# Patient Record
Sex: MALE | NOT HISPANIC OR LATINO | URBAN - METROPOLITAN AREA
[De-identification: names, ages, dates, MRNs, and addresses within clinical notes are randomized per-mention and may not be internally consistent; named-entity substitution may affect disease eponyms.]

---

## 2020-07-25 ENCOUNTER — TELEPHONE ENCOUNTER (OUTPATIENT)
Dept: URBAN - METROPOLITAN AREA CLINIC 13 | Facility: CLINIC | Age: 75
End: 2020-07-25

## 2020-07-25 RX ORDER — POLYETHYLENE GLYCOL 3350, SODIUM CHLORIDE, SODIUM BICARBONATE AND POTASSIUM CHLORIDE WITH LEMON FLAVOR 420; 11.2; 5.72; 1.48 G/4L; G/4L; G/4L; G/4L
TAKE 1/2 GALLON AT 5:00 PM DAY BEFORE PROCEDURE, TAKE SECOND 1/2 OF GALLON 6 HRS PRIOR TO PROCEDURE POWDER, FOR SOLUTION ORAL
Qty: 1 | Refills: 0 | OUTPATIENT
Start: 2019-11-13 | End: 2019-12-06

## 2020-07-25 RX ORDER — GEMFIBROZIL 600 MG/1
TABLET, FILM COATED ORAL
Refills: 0 | OUTPATIENT
Start: 2007-03-07 | End: 2019-10-15

## 2020-07-25 RX ORDER — ASPIRIN 81 MG/1
CHEW AND SWALLOW 1 TABLET DAILY TABLET, CHEWABLE ORAL
Refills: 0 | OUTPATIENT
Start: 2019-10-15 | End: 2019-12-06

## 2020-07-26 ENCOUNTER — TELEPHONE ENCOUNTER (OUTPATIENT)
Dept: URBAN - METROPOLITAN AREA CLINIC 13 | Facility: CLINIC | Age: 75
End: 2020-07-26

## 2020-07-26 RX ORDER — CEPHALEXIN 500 MG/1
CAPSULE ORAL
Qty: 40 | Refills: 0 | Status: ACTIVE | COMMUNITY
Start: 2019-05-22

## 2020-07-26 RX ORDER — AMOXICILLIN 500 MG/1
CAPSULE ORAL
Qty: 4 | Refills: 0 | Status: ACTIVE | COMMUNITY
Start: 2018-10-19

## 2020-07-26 RX ORDER — FENOFIBRATE 145 MG/1
TAKE 1 TABLET DAILY TABLET, FILM COATED ORAL
Refills: 0 | Status: ACTIVE | COMMUNITY
Start: 2019-10-15

## 2020-07-26 RX ORDER — LEVOTHYROXINE SODIUM 112 UG/1
TAKE 1 TABLET DAILY TABLET ORAL
Refills: 0 | Status: ACTIVE | COMMUNITY
Start: 2019-10-15

## 2020-07-26 RX ORDER — ASCORBIC ACID 1000 MG
TAKE 1 CAPSULE DAILY TABLET ORAL
Refills: 0 | Status: ACTIVE | COMMUNITY
Start: 2019-10-15

## 2020-07-26 RX ORDER — METHYLPREDNISOLONE 4 MG/1
TABLET ORAL
Qty: 21 | Refills: 0 | Status: ACTIVE | COMMUNITY
Start: 2019-11-25

## 2020-07-26 RX ORDER — SITAGLIPTIN PHOSPHATE 100 MG
TAKE 1 TABLET DAILY TABLET ORAL
Refills: 0 | Status: ACTIVE | COMMUNITY
Start: 2019-10-15

## 2020-07-26 RX ORDER — LISINOPRIL 5 MG/1
TAKE 1 TABLET DAILY TABLET ORAL
Refills: 0 | Status: ACTIVE | COMMUNITY
Start: 2019-10-15

## 2020-07-26 RX ORDER — DICLOFENAC SODIUM 10 MG/G
GEL TOPICAL
Qty: 100 | Refills: 0 | Status: ACTIVE | COMMUNITY
Start: 2019-10-22

## 2020-07-26 RX ORDER — MELOXICAM 15 MG/1
TABLET ORAL
Qty: 30 | Refills: 0 | Status: ACTIVE | COMMUNITY
Start: 2019-10-02

## 2020-07-26 RX ORDER — HYDROCODONE BITARTRATE AND ACETAMINOPHEN 7.5; 325 MG/1; MG/1
TABLET ORAL
Qty: 28 | Refills: 0 | Status: ACTIVE | COMMUNITY
Start: 2019-05-22

## 2020-07-26 RX ORDER — DICLOFENAC SODIUM 75 MG/1
TABLET, DELAYED RELEASE ORAL
Qty: 60 | Refills: 0 | Status: ACTIVE | COMMUNITY
Start: 2018-12-07

## 2020-07-26 RX ORDER — LISINOPRIL 10 MG/1
TABLET ORAL
Qty: 90 | Refills: 0 | Status: ACTIVE | COMMUNITY
Start: 2019-03-08

## 2020-07-26 RX ORDER — TAMSULOSIN HYDROCHLORIDE 0.4 MG/1
TAKE 1 CAPSULE DAILY CAPSULE ORAL
Refills: 0 | Status: ACTIVE | COMMUNITY
Start: 2019-10-15

## 2020-07-26 RX ORDER — TIZANIDINE 4 MG/1
TABLET ORAL
Qty: 30 | Refills: 0 | Status: ACTIVE | COMMUNITY
Start: 2019-02-22

## 2020-07-26 RX ORDER — LISINOPRIL 10 MG/1
TABLET ORAL
Qty: 90 | Refills: 0 | Status: ACTIVE | COMMUNITY
Start: 2019-10-15

## 2020-07-26 RX ORDER — ONDANSETRON HYDROCHLORIDE 4 MG/1
TABLET, FILM COATED ORAL
Qty: 60 | Refills: 0 | Status: ACTIVE | COMMUNITY
Start: 2019-05-20

## 2020-07-26 RX ORDER — SILDENAFIL CITRATE 100 MG/1
TAKE 1 TABLET DAILY 1 HOUR BEFORE NEEDED TABLET, FILM COATED ORAL
Refills: 0 | Status: ACTIVE | COMMUNITY
Start: 2019-10-15

## 2020-07-26 RX ORDER — TIZANIDINE 4 MG/1
TABLET ORAL
Qty: 90 | Refills: 0 | Status: ACTIVE | COMMUNITY
Start: 2019-05-20

## 2020-07-26 RX ORDER — ROSUVASTATIN CALCIUM 40 MG/1
TAKE 1 TABLET DAILY TABLET, FILM COATED ORAL
Refills: 0 | Status: ACTIVE | COMMUNITY
Start: 2019-10-15

## 2022-02-16 ENCOUNTER — WEB ENCOUNTER (OUTPATIENT)
Dept: URBAN - METROPOLITAN AREA CLINIC 113 | Facility: CLINIC | Age: 77
End: 2022-02-16

## 2022-02-16 ENCOUNTER — OFFICE VISIT (OUTPATIENT)
Dept: URBAN - METROPOLITAN AREA CLINIC 113 | Facility: CLINIC | Age: 77
End: 2022-02-16
Payer: MEDICARE

## 2022-02-16 VITALS
HEART RATE: 84 BPM | DIASTOLIC BLOOD PRESSURE: 79 MMHG | SYSTOLIC BLOOD PRESSURE: 149 MMHG | WEIGHT: 219.4 LBS | HEIGHT: 69 IN | BODY MASS INDEX: 32.5 KG/M2 | TEMPERATURE: 97.8 F | RESPIRATION RATE: 20 BRPM

## 2022-02-16 DIAGNOSIS — K59.09 CHRONIC CONSTIPATION WITH OVERFLOW INCONTINENCE: ICD-10-CM

## 2022-02-16 DIAGNOSIS — Z86.010 HISTORY OF COLON POLYPS: ICD-10-CM

## 2022-02-16 DIAGNOSIS — R10.13 EPIGASTRIC BURNING SENSATION: ICD-10-CM

## 2022-02-16 PROCEDURE — 99203 OFFICE O/P NEW LOW 30 MIN: CPT

## 2022-02-16 RX ORDER — CEPHALEXIN 500 MG/1
CAPSULE ORAL
Qty: 40 | Refills: 0 | Status: ON HOLD | COMMUNITY
Start: 2019-05-22

## 2022-02-16 RX ORDER — SITAGLIPTIN PHOSPHATE 100 MG
TAKE 1 TABLET DAILY TABLET ORAL
Refills: 0 | Status: ACTIVE | COMMUNITY
Start: 2019-10-15

## 2022-02-16 RX ORDER — MELOXICAM 15 MG/1
1 TABLET TABLET ORAL ONCE A DAY
Refills: 0 | Status: ACTIVE | COMMUNITY
Start: 2019-10-02

## 2022-02-16 RX ORDER — LISINOPRIL 5 MG/1
TAKE 1 TABLET DAILY TABLET ORAL
Refills: 0 | Status: ON HOLD | COMMUNITY
Start: 2019-10-15

## 2022-02-16 RX ORDER — AMOXICILLIN 500 MG/1
CAPSULE ORAL
Qty: 4 | Refills: 0 | Status: ON HOLD | COMMUNITY
Start: 2018-10-19

## 2022-02-16 RX ORDER — LEVOTHYROXINE SODIUM 112 UG/1
TAKE 1 TABLET DAILY TABLET ORAL
Refills: 0 | Status: ON HOLD | COMMUNITY
Start: 2019-10-15

## 2022-02-16 RX ORDER — HYDROCODONE BITARTRATE AND ACETAMINOPHEN 7.5; 325 MG/1; MG/1
TABLET ORAL
Qty: 28 | Refills: 0 | Status: ON HOLD | COMMUNITY
Start: 2019-05-22

## 2022-02-16 RX ORDER — ASCORBIC ACID 1000 MG
TAKE 1 CAPSULE DAILY TABLET ORAL
Refills: 0 | Status: ACTIVE | COMMUNITY
Start: 2019-10-15

## 2022-02-16 RX ORDER — LISINOPRIL 10 MG/1
1 TABLET TABLET ORAL ONCE A DAY
Refills: 0 | Status: ACTIVE | COMMUNITY
Start: 2019-03-08

## 2022-02-16 RX ORDER — ONDANSETRON HYDROCHLORIDE 4 MG/1
TABLET, FILM COATED ORAL
Qty: 60 | Refills: 0 | Status: ON HOLD | COMMUNITY
Start: 2019-05-20

## 2022-02-16 RX ORDER — DICLOFENAC SODIUM 75 MG/1
TABLET, DELAYED RELEASE ORAL
Qty: 60 | Refills: 0 | Status: ON HOLD | COMMUNITY
Start: 2018-12-07

## 2022-02-16 RX ORDER — TIZANIDINE 4 MG/1
TABLET ORAL
Qty: 30 | Refills: 0 | Status: ON HOLD | COMMUNITY
Start: 2019-02-22

## 2022-02-16 RX ORDER — LEVOTHYROXINE SODIUM 112 UG/1
1 TABLET IN THE MORNING ON AN EMPTY STOMACH TABLET ORAL ONCE A DAY
Status: ACTIVE | COMMUNITY

## 2022-02-16 RX ORDER — METHYLPREDNISOLONE 4 MG/1
TABLET ORAL
Qty: 21 | Refills: 0 | Status: ON HOLD | COMMUNITY
Start: 2019-11-25

## 2022-02-16 RX ORDER — SILDENAFIL CITRATE 100 MG/1
TAKE 1 TABLET DAILY 1 HOUR BEFORE NEEDED TABLET, FILM COATED ORAL
Refills: 0 | Status: ACTIVE | COMMUNITY
Start: 2019-10-15

## 2022-02-16 RX ORDER — ROSUVASTATIN CALCIUM 40 MG/1
TAKE 1 TABLET DAILY TABLET, FILM COATED ORAL
Refills: 0 | Status: ON HOLD | COMMUNITY
Start: 2019-10-15

## 2022-02-16 RX ORDER — TAMSULOSIN HYDROCHLORIDE 0.4 MG/1
TAKE 1 CAPSULE DAILY CAPSULE ORAL
Refills: 0 | Status: ACTIVE | COMMUNITY
Start: 2019-10-15

## 2022-02-16 RX ORDER — DICLOFENAC SODIUM 10 MG/G
GEL TOPICAL
Qty: 100 | Refills: 0 | Status: ACTIVE | COMMUNITY
Start: 2019-10-22

## 2022-02-16 RX ORDER — FENOFIBRATE 145 MG/1
TAKE 1 TABLET DAILY TABLET, FILM COATED ORAL
Refills: 0 | Status: ACTIVE | COMMUNITY
Start: 2019-10-15

## 2022-02-16 NOTE — HPI-OTHER HISTORIES
Colonoscopy 12/6/2019: BBPS score of nine with NuLytely. Ileum was normal. Three 4-7 mm polyps in the mid sigmoid colon, proximal ascending colon and appendiceal orifice were removed. Diverticulosis of the sigmoid colon.Pathology revealed sessile serrated polyps and hyperplastic polyps.  A repeat colonoscopy was recommended in 5 years time. Due in 2024.

## 2022-02-16 NOTE — HPI-TODAY'S VISIT:
76-year-old male presents for evaluation of anemia. He does have a history of hypothyroidism, T2DM, hyperlipidemia, secondary polycythemia, hypertension, and arteriosclerosis.   Labs on 9/17/21: normal H/H, elevated .4, normal PSA, normal CMP, normal TSH, elevated testosterone 1048, normal hemoglobin A1c, elevated microalbumin.   Patient presents with a change in bowel habits for the last 3 months. He has a baseline of constipation predominant bowel habits with bowel movements every 2-3 days. However, he is now having a bowel movement once per week. The bowel movement is soft and takes him up to an hour to completely empty. He admits to feelings of incomplete evacuation and fecal smearing of his underwear. Upon further questioning, he admits he's had "stool impactions" in the past requiring him to disimpact himself with his finger and use suppositories. He denies any associated abdominal pain, rectal bleeding, nausea or vomiting. He admits he does not eat enough fruits and vegetables. He is not currently on a bowel regimen. He also has episodes of epigastric burning that occur once per month. The pain is not necessarily associated with food and typically resolves with water, Tums or consumption of bread. He is not currently on a PPI. He does take Meloxicam daily for arthritic pains. He does not have his gallbladder. He is planning to see his PCP, Dr. Meyer, for routine blood work soon.

## 2022-04-18 ENCOUNTER — OFFICE VISIT (OUTPATIENT)
Dept: URBAN - METROPOLITAN AREA CLINIC 113 | Facility: CLINIC | Age: 77
End: 2022-04-18

## 2022-04-18 RX ORDER — ASCORBIC ACID 1000 MG
TAKE 1 CAPSULE DAILY TABLET ORAL
Refills: 0 | Status: ACTIVE | COMMUNITY
Start: 2019-10-15

## 2022-04-18 RX ORDER — MELOXICAM 15 MG/1
1 TABLET TABLET ORAL ONCE A DAY
Refills: 0 | Status: ACTIVE | COMMUNITY
Start: 2019-10-02

## 2022-04-18 RX ORDER — DICLOFENAC SODIUM 75 MG/1
TABLET, DELAYED RELEASE ORAL
Qty: 60 | Refills: 0 | Status: ON HOLD | COMMUNITY
Start: 2018-12-07

## 2022-04-18 RX ORDER — CEPHALEXIN 500 MG/1
CAPSULE ORAL
Qty: 40 | Refills: 0 | Status: ON HOLD | COMMUNITY
Start: 2019-05-22

## 2022-04-18 RX ORDER — TIZANIDINE 4 MG/1
TABLET ORAL
Qty: 30 | Refills: 0 | Status: ON HOLD | COMMUNITY
Start: 2019-02-22

## 2022-04-18 RX ORDER — ROSUVASTATIN CALCIUM 40 MG/1
TAKE 1 TABLET DAILY TABLET, FILM COATED ORAL
Refills: 0 | Status: ON HOLD | COMMUNITY
Start: 2019-10-15

## 2022-04-18 RX ORDER — DICLOFENAC SODIUM 10 MG/G
GEL TOPICAL
Qty: 100 | Refills: 0 | Status: ACTIVE | COMMUNITY
Start: 2019-10-22

## 2022-04-18 RX ORDER — AMOXICILLIN 500 MG/1
CAPSULE ORAL
Qty: 4 | Refills: 0 | Status: ON HOLD | COMMUNITY
Start: 2018-10-19

## 2022-04-18 RX ORDER — LISINOPRIL 5 MG/1
TAKE 1 TABLET DAILY TABLET ORAL
Refills: 0 | Status: ON HOLD | COMMUNITY
Start: 2019-10-15

## 2022-04-18 RX ORDER — SILDENAFIL CITRATE 100 MG/1
TAKE 1 TABLET DAILY 1 HOUR BEFORE NEEDED TABLET, FILM COATED ORAL
Refills: 0 | Status: ACTIVE | COMMUNITY
Start: 2019-10-15

## 2022-04-18 RX ORDER — LEVOTHYROXINE SODIUM 112 UG/1
TAKE 1 TABLET DAILY TABLET ORAL
Refills: 0 | Status: ON HOLD | COMMUNITY
Start: 2019-10-15

## 2022-04-18 RX ORDER — TAMSULOSIN HYDROCHLORIDE 0.4 MG/1
TAKE 1 CAPSULE DAILY CAPSULE ORAL
Refills: 0 | Status: ACTIVE | COMMUNITY
Start: 2019-10-15

## 2022-04-18 RX ORDER — LISINOPRIL 10 MG/1
1 TABLET TABLET ORAL ONCE A DAY
Refills: 0 | Status: ACTIVE | COMMUNITY
Start: 2019-03-08

## 2022-04-18 RX ORDER — FENOFIBRATE 145 MG/1
TAKE 1 TABLET DAILY TABLET, FILM COATED ORAL
Refills: 0 | Status: ACTIVE | COMMUNITY
Start: 2019-10-15

## 2022-04-18 RX ORDER — ONDANSETRON HYDROCHLORIDE 4 MG/1
TABLET, FILM COATED ORAL
Qty: 60 | Refills: 0 | Status: ON HOLD | COMMUNITY
Start: 2019-05-20

## 2022-04-18 RX ORDER — LEVOTHYROXINE SODIUM 112 UG/1
1 TABLET IN THE MORNING ON AN EMPTY STOMACH TABLET ORAL ONCE A DAY
Status: ACTIVE | COMMUNITY

## 2022-04-18 RX ORDER — METHYLPREDNISOLONE 4 MG/1
TABLET ORAL
Qty: 21 | Refills: 0 | Status: ON HOLD | COMMUNITY
Start: 2019-11-25

## 2022-04-18 RX ORDER — HYDROCODONE BITARTRATE AND ACETAMINOPHEN 7.5; 325 MG/1; MG/1
TABLET ORAL
Qty: 28 | Refills: 0 | Status: ON HOLD | COMMUNITY
Start: 2019-05-22

## 2022-04-18 RX ORDER — SITAGLIPTIN PHOSPHATE 100 MG
TAKE 1 TABLET DAILY TABLET ORAL
Refills: 0 | Status: ACTIVE | COMMUNITY
Start: 2019-10-15

## 2022-04-18 NOTE — HPI-TODAY'S VISIT:
76-year-old male presents for evaluation of anemia. He does have a history of hypothyroidism, T2DM, hyperlipidemia, secondary polycythemia, hypertension, and arteriosclerosis.   Labs on 9/17/21: normal H/H, elevated .4, normal PSA, normal CMP, normal TSH, elevated testosterone 1048, normal hemoglobin A1c, elevated microalbumin.   Patient presents with a change in bowel habits for the last 3 months. He has a baseline of constipation predominant bowel habits with bowel movements every 2-3 days. However, he is now having a bowel movement once per week. The bowel movement is soft and takes him up to an hour to completely empty. He admits to feelings of incomplete evacuation and fecal smearing of his underwear. Upon further questioning, he admits he's had "stool impactions" in the past requiring him to disimpact himself with his finger and use suppositories. He denies any associated abdominal pain, rectal bleeding, nausea or vomiting. He admits he does not eat enough fruits and vegetables. He is not currently on a bowel regimen. He also has episodes of epigastric burning that occur once per month. The pain is not necessarily associated with food and typically resolves with water, Tums or consumption of bread. He is not currently on a PPI. He does take Meloxicam daily for arthritic pains. He does not have his gallbladder. He is planning to see his PCP, Dr. Meyer, for routine blood work soon.   Interval history: 77-year-old male presents for follow-up.  He was last seen on 2/16/2022 for evaluation of anemia however labs revealed normal CBC.  He did complain of constipation predominant bowel habits with recent worsening, episodic diarrhea and fecal smearing.  Suspected chronic constipation with overflow incontinence given prior history of impactions and chronic constipation.  He was advised to perform a bowel purge with 1 bottle of magnesium citrate and then start a daily regimen of MiraLAX 1 capful daily Benefiber 1 tablespoon daily.  We would consider colonoscopy if no improvement.  He did also report episodic burning in his stomach occurring once per month.  Suspected possible NSAID induced gastritis or reflux.  Recommended daily PPI therapy given his daily use of meloxicam for arthritic pain.  Patient would prefer to continue management with Tums as needed as it was not bothersome.  He does have a history of colon polyps and is due for surveillance in 2024.

## 2022-11-17 ENCOUNTER — DASHBOARD ENCOUNTERS (OUTPATIENT)
Age: 77
End: 2022-11-17

## 2022-11-17 ENCOUNTER — OFFICE VISIT (OUTPATIENT)
Dept: URBAN - METROPOLITAN AREA CLINIC 113 | Facility: CLINIC | Age: 77
End: 2022-11-17
Payer: MEDICARE

## 2022-11-17 VITALS
DIASTOLIC BLOOD PRESSURE: 91 MMHG | HEIGHT: 69 IN | RESPIRATION RATE: 20 BRPM | BODY MASS INDEX: 32.41 KG/M2 | HEART RATE: 81 BPM | SYSTOLIC BLOOD PRESSURE: 165 MMHG | WEIGHT: 218.8 LBS | TEMPERATURE: 97.7 F

## 2022-11-17 DIAGNOSIS — Z86.010 HISTORY OF COLON POLYPS: ICD-10-CM

## 2022-11-17 DIAGNOSIS — K59.09 CHRONIC CONSTIPATION WITH OVERFLOW INCONTINENCE: ICD-10-CM

## 2022-11-17 DIAGNOSIS — R10.13 EPIGASTRIC BURNING SENSATION: ICD-10-CM

## 2022-11-17 PROBLEM — 428283002: Status: ACTIVE | Noted: 2022-02-16

## 2022-11-17 PROCEDURE — 99214 OFFICE O/P EST MOD 30 MIN: CPT

## 2022-11-17 RX ORDER — LEVOTHYROXINE SODIUM 112 UG/1
1 TABLET IN THE MORNING ON AN EMPTY STOMACH TABLET ORAL ONCE A DAY
Status: ACTIVE | COMMUNITY

## 2022-11-17 RX ORDER — METHYLPREDNISOLONE 4 MG/1
TABLET ORAL
Qty: 21 | Refills: 0 | Status: ON HOLD | COMMUNITY
Start: 2019-11-25

## 2022-11-17 RX ORDER — EVOLOCUMAB 140 MG/ML
1 ML INJECTION, SOLUTION SUBCUTANEOUS
Status: ACTIVE | COMMUNITY

## 2022-11-17 RX ORDER — LISINOPRIL 10 MG/1
1 TABLET TABLET ORAL ONCE A DAY
Refills: 0 | Status: ACTIVE | COMMUNITY
Start: 2019-03-08

## 2022-11-17 RX ORDER — LEVOTHYROXINE SODIUM 112 UG/1
TAKE 1 TABLET DAILY TABLET ORAL
Refills: 0 | Status: ON HOLD | COMMUNITY
Start: 2019-10-15

## 2022-11-17 RX ORDER — ROSUVASTATIN CALCIUM 40 MG/1
TAKE 1 TABLET DAILY TABLET, FILM COATED ORAL
Refills: 0 | Status: ON HOLD | COMMUNITY
Start: 2019-10-15

## 2022-11-17 RX ORDER — SITAGLIPTIN PHOSPHATE 100 MG
TAKE 1 TABLET DAILY TABLET ORAL
Refills: 0 | Status: ACTIVE | COMMUNITY
Start: 2019-10-15

## 2022-11-17 RX ORDER — ONDANSETRON HYDROCHLORIDE 4 MG/1
TABLET, FILM COATED ORAL
Qty: 60 | Refills: 0 | Status: ON HOLD | COMMUNITY
Start: 2019-05-20

## 2022-11-17 RX ORDER — TIZANIDINE 4 MG/1
TABLET ORAL
Qty: 30 | Refills: 0 | Status: ON HOLD | COMMUNITY
Start: 2019-02-22

## 2022-11-17 RX ORDER — DICLOFENAC SODIUM 10 MG/G
GEL TOPICAL
Qty: 100 | Refills: 0 | Status: ON HOLD | COMMUNITY
Start: 2019-10-22

## 2022-11-17 RX ORDER — LISINOPRIL 5 MG/1
TAKE 1 TABLET DAILY TABLET ORAL
Refills: 0 | Status: ON HOLD | COMMUNITY
Start: 2019-10-15

## 2022-11-17 RX ORDER — MELOXICAM 15 MG/1
1 TABLET TABLET ORAL ONCE A DAY
Refills: 0 | Status: ACTIVE | COMMUNITY
Start: 2019-10-02

## 2022-11-17 RX ORDER — TAMSULOSIN HYDROCHLORIDE 0.4 MG/1
TAKE 1 CAPSULE DAILY CAPSULE ORAL
Refills: 0 | Status: ACTIVE | COMMUNITY
Start: 2019-10-15

## 2022-11-17 RX ORDER — CEPHALEXIN 500 MG/1
CAPSULE ORAL
Qty: 40 | Refills: 0 | Status: ON HOLD | COMMUNITY
Start: 2019-05-22

## 2022-11-17 RX ORDER — FENOFIBRATE 145 MG/1
TAKE 1 TABLET DAILY TABLET, FILM COATED ORAL
Refills: 0 | Status: ACTIVE | COMMUNITY
Start: 2019-10-15

## 2022-11-17 RX ORDER — HYDROCODONE BITARTRATE AND ACETAMINOPHEN 7.5; 325 MG/1; MG/1
TABLET ORAL
Qty: 28 | Refills: 0 | Status: ON HOLD | COMMUNITY
Start: 2019-05-22

## 2022-11-17 RX ORDER — AMOXICILLIN 500 MG/1
CAPSULE ORAL
Qty: 4 | Refills: 0 | Status: ON HOLD | COMMUNITY
Start: 2018-10-19

## 2022-11-17 RX ORDER — DICLOFENAC SODIUM 75 MG/1
TABLET, DELAYED RELEASE ORAL
Qty: 60 | Refills: 0 | Status: ON HOLD | COMMUNITY
Start: 2018-12-07

## 2022-11-17 RX ORDER — SILDENAFIL CITRATE 100 MG/1
TAKE 1 TABLET DAILY 1 HOUR BEFORE NEEDED TABLET, FILM COATED ORAL
Refills: 0 | Status: ACTIVE | COMMUNITY
Start: 2019-10-15

## 2022-11-17 RX ORDER — ASCORBIC ACID 1000 MG
TAKE 1 CAPSULE DAILY TABLET ORAL
Refills: 0 | Status: ON HOLD | COMMUNITY
Start: 2019-10-15

## 2022-11-17 NOTE — HPI-TODAY'S VISIT:
76-year-old male presents for evaluation of anemia. He does have a history of hypothyroidism, T2DM, hyperlipidemia, secondary polycythemia, hypertension, and arteriosclerosis.   Labs on 9/17/21: normal H/H, elevated .4, normal PSA, normal CMP, normal TSH, elevated testosterone 1048, normal hemoglobin A1c, elevated microalbumin.   Patient presents with a change in bowel habits for the last 3 months. He has a baseline of constipation predominant bowel habits with bowel movements every 2-3 days. However, he is now having a bowel movement once per week. The bowel movement is soft and takes him up to an hour to completely empty. He admits to feelings of incomplete evacuation and fecal smearing of his underwear. Upon further questioning, he admits he's had "stool impactions" in the past requiring him to disimpact himself with his finger and use suppositories. He denies any associated abdominal pain, rectal bleeding, nausea or vomiting. He admits he does not eat enough fruits and vegetables. He is not currently on a bowel regimen. He also has episodes of epigastric burning that occur once per month. The pain is not necessarily associated with food and typically resolves with water, Tums or consumption of bread. He is not currently on a PPI. He does take Meloxicam daily for arthritic pains. He does not have his gallbladder. He is planning to see his PCP, Dr. Meyer, for routine blood work soon.   Interval history: 77-year-old male presents for follow-up.  He was last seen on 2/16/2022 for evaluation of anemia however labs revealed normal CBC.  He did complain of constipation predominant bowel habits with recent worsening, episodic diarrhea and fecal smearing.  Suspected chronic constipation with overflow incontinence given prior history of impactions and chronic constipation.  He was advised to perform a bowel purge with 1 bottle of magnesium citrate and then start a daily regimen of MiraLAX 1 capful daily Benefiber 1 tablespoon daily.  We would consider colonoscopy if no improvement.  He did also report episodic burning in his stomach occurring once per month.  Suspected possible NSAID induced gastritis or reflux.  Recommended daily PPI therapy given his daily use of meloxicam for arthritic pain.  Patient would prefer to continue management with Tums as needed as it was not bothersome.  He does have a history of colon polyps and is due for surveillance in 2024.  Labs 3/4/22: negative urinalysis, elevated hgb 18.4, elevated hct 54.9, MCv 94.9, normal plts, potassium 5.1, BUN 24, normal LFTs, normal TSH, elevated testosterone 26089.97, normal Hgb A1c 5.5,  elevated free testosterone 25.1.  He did recently fall on his left knee and tore his patellar tendon. He underwent surgery and is currently doing PT. He is using a cane currently to balance. His bowel movements are now mostly regular on Benefiber 1 tbsp and MiraLAX 1 capful daily. He does still occasional have urgency and incontrollable diarrhea. This may happen once in 3 months. He has since cut down his dose of MiraLAX to 3/4 capful a day. He denies nay further episodes of epigastric burning. He does continue to take Meloxicam. He is not on a PPI medication. He denies any unintentional weight loss. He denies blood per rectum or melena.